# Patient Record
(demographics unavailable — no encounter records)

---

## 2025-01-06 NOTE — CARDIOLOGY SUMMARY
[Today's Date] : [unfilled] [de-identified] : 12/30/2024 [FreeTextEntry1] : Normal sinus rhythm Right Axis Deviation Early repolarization -402 ms  [de-identified] : 2/12/2024 [FreeTextEntry2] : Summary: 1. 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 2. 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 3. Normal mitral valve morphology and inflow Doppler profile and redundant chordae tendinae of anterior leaflet. 4. Trivial mitral valve regurgitation. 5. Trivial tricuspid valve regurgitation, peak systolic instantaneous gradient 9.1 mmHg. 6. Trivial pulmonary valve regurgitation. 7. No pericardial effusion. [de-identified] : 12.27/2024 Chol 207  Trig 47 5/28/24 Chol 200  Trig 87 1/2/24 [id number]  HDL 47 Trig 167 8/8/23 Chol 235  Trig 74

## 2025-01-06 NOTE — CARDIOLOGY SUMMARY
[Today's Date] : [unfilled] [de-identified] : 12/30/2024 [FreeTextEntry1] : Normal sinus rhythm Right Axis Deviation Early repolarization -402 ms  [de-identified] : 2/12/2024 [FreeTextEntry2] : Summary: 1. 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 2. 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 3. Normal mitral valve morphology and inflow Doppler profile and redundant chordae tendinae of anterior leaflet. 4. Trivial mitral valve regurgitation. 5. Trivial tricuspid valve regurgitation, peak systolic instantaneous gradient 9.1 mmHg. 6. Trivial pulmonary valve regurgitation. 7. No pericardial effusion. [de-identified] : 12.27/2024 Chol 207  Trig 47 5/28/24 Chol 200  Trig 87 1/2/24 [id number]  HDL 47 Trig 167 8/8/23 Chol 235  Trig 74

## 2025-01-06 NOTE — CONSULT LETTER
[Today's Date] : [unfilled] [Name] : Name: [unfilled] [] : : ~~ [Today's Date:] : [unfilled] [Dear  ___:] : Dear Dr. [unfilled]: [Consult] : I had the pleasure of evaluating your patient, [unfilled]. My full evaluation follows. [Consult - Single Provider] : Thank you very much for allowing me to participate in the care of this patient. If you have any questions, please do not hesitate to contact me. [Sincerely,] : Sincerely, [FreeTextEntry4] : Bryanna Dumont MD [FreeTextEntry5] : [address] [facility] [FreeTextEntry6] : [place] [address] [de-identified] : Barry E. Goldberg, MD FACC, FAAP, FACE\par  [facility]\par  [facility]\par  Chief Pediatric Cardiology\par

## 2025-01-06 NOTE — PHYSICAL EXAM
[General Appearance - Alert] : alert [General Appearance - In No Acute Distress] : in no acute distress [General Appearance - Well Nourished] : well nourished [General Appearance - Well Developed] : well developed [General Appearance - Well-Appearing] : well appearing [Appearance Of Head] : the head was normocephalic [Facies] : there were no dysmorphic facial features [Sclera] : the conjunctiva were normal [Outer Ear] : the ears and nose were normal in appearance [Examination Of The Oral Cavity] : mucous membranes were moist and pink [Respiration, Rhythm And Depth] : normal respiratory rhythm and effort [Auscultation Breath Sounds / Voice Sounds] : breath sounds clear to auscultation bilaterally [No Cough] : no cough [Stridor] : no stridor was observed [Apical Impulse] : quiet precordium with normal apical impulse [Heart Rate And Rhythm] : normal heart rate and rhythm [Heart Sounds] : normal S1 and S2 [No Murmur] : no murmurs  [Heart Sounds Gallop] : no gallops [Heart Sounds Pericardial Friction Rub] : no pericardial rub [Heart Sounds Click] : no clicks [Arterial Pulses] : normal upper and lower extremity pulses with no pulse delay [Edema] : no edema [Capillary Refill Test] : normal capillary refill [Bowel Sounds] : normal bowel sounds [Abdomen Soft] : soft [Nondistended] : nondistended [Abdomen Tenderness] : non-tender [Musculoskeletal Exam: Normal Movement Of All Extremities] : normal movements of all extremities [Musculoskeletal - Swelling] : no joint swelling seen [Musculoskeletal - Tenderness] : no joint tenderness was elicited [Nail Clubbing] : no clubbing  or cyanosis of the fingers [Motor Tone] : normal muscle strength and tone [Cervical Lymph Nodes Enlarged Anterior] : The anterior cervical nodes were normal [] : no rash [Skin Lesions] : no lesions [Skin Turgor] : normal turgor [Skin Color & Pigmentation] : normal skin color and pigmentation [Demonstrated Behavior - Infant Nonreactive To Parents] : interactive [Mood] : mood and affect were appropriate for age [Demonstrated Behavior] : normal behavior [FreeTextEntry1] : striae

## 2025-01-06 NOTE — REASON FOR VISIT
[Follow-Up] : a follow-up visit for [Patient] : patient [Father] : father [FreeTextEntry1] : congential tricuspid regurgitation [FreeTextEntry3] : PFO

## 2025-01-06 NOTE — CONSULT LETTER
[Today's Date] : [unfilled] [Name] : Name: [unfilled] [] : : ~~ [Today's Date:] : [unfilled] [Dear  ___:] : Dear Dr. [unfilled]: [Consult] : I had the pleasure of evaluating your patient, [unfilled]. My full evaluation follows. [Consult - Single Provider] : Thank you very much for allowing me to participate in the care of this patient. If you have any questions, please do not hesitate to contact me. [Sincerely,] : Sincerely, [FreeTextEntry4] : Bryanna Dumont MD [FreeTextEntry5] : [address]  [facility] [FreeTextEntry6] : [place] [address] [de-identified] : Barry E. Goldberg, MD FACC, FAAP, FACE\par  [facility]\par  [facility]\par  Chief Pediatric Cardiology\par

## 2025-01-06 NOTE — HISTORY OF PRESENT ILLNESS
[FreeTextEntry1] : Jose D presented for follow up on Dec 30, 2024.  He was last evaluated on Feb 12, 2024. He is now 18 years old. 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   Jose D has been well since his last visit . There is no history of dyspnea on exertion, easy fatigability, excessive sweating, palpitations, skipped beats, extra beats or syncopal episodes. There have been no explained fevers, rash or hematuria. To review, he was evaluated on November 26, 2018 when he presented for urgent evaluation. Wednesday 11/21/2018 he was pale at school. He came home and went to bed. He was sleepy and was acting like he had a virus. He became dehydrated but was not febrile. He vomited once. He had some abdominal pain. His mom did an exam and felt if he had appendicitis.  [facility] He was given Zofran and IV fluids. His flu swab was negative. He had bilateral pain. He did not respond and was sent to [facility]. However, mom decided to take him to [facility]. He was subsequently diagnosed with appendicitis. The resident was concerned about a augmented PMI. Cardiology follow up revealed no changes to his clinical examination, EKG or echocardiogram. He has not had any recurrences of augmented PMI  He exercises every day. He had hurt his arm at school and had PT.  In Feb 2024 he presented with another chief complaint of high cholesterol.  His diet is affected by his sensory processing and auditory processing which makes him sensitive to certain textures.  He has tried his best to improve his diet  He gets allergy shots. He is followed by Dr. Khoury. He has a history of asthma which is well-controlled. He is not on any medications at this time. He is followed by Dr. Anne.  He got a tic bite behind the right ear and developed cellulitis. He responded to oral antibiotic. He will have Lyme test.  He plays tennis and swims and is able to keep up with his peers.   He has not been hospitalized. He has not had surgery.  He has a history of ligamentous laxity.   Mom has high cholesterol.  She still has not had her calcium score. Dad has night cholesterol but not high enough to treat.  He also has had HBP His sister is well. There is no family history of congenital heart disease, cardiomyopathies, arrhythmias, genetic heart disease or sudden cardiac death.

## 2025-01-06 NOTE — DISCUSSION/SUMMARY
[PE + No Restrictions] : [unfilled] may participate in the entire physical education program without restriction, including all varsity competitive sports. [Influenza vaccine is recommended] : Influenza vaccine is recommended [FreeTextEntry1] : ZEB's  workup revealed: He was not re-echo'd today. 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 This is of no hemodynamic significance. -He  had the incidental finding of mitral insufficiency. The insufficiency did not appear to be hemodynamically significant and represents a normal variant. -He  had the incidental finding of trivial tricuspid insufficiency. Trivial tricuspid insufficiency is a common finding, considered a physiologic variant of normal and  allowed us to calculate estimated pulmonary artery pressures as normal. -He had the incidental finding of pulmonary insufficiency. The insufficiency did not appear to be hemodynamically significant and represents a normal variant  He has had worsening of his Lipid profile His cholesterol is up 7 points from last blood draw. The LDL is up 12 points. He has been unable to tolerate the psyllium husk. This is in context of prior testing which revealed that he has normal Apolipoprotein B and Lipoprotein A which are independent risk factors for heart disease when elevated.  We again discussed ways to reduce cholesterol: -Using Benacol spread in place of margarine or butter -Psyllium Husk powder - We will try Metamucil this time  -Oatmeal -Oatmeal cookies made with apple sauce -Decreasing dairy and saturated fats. Dietary changed are difficult due to his issue with textures  The importance of excellent hydration starting early in the morning and continue throughout the day was discussed at length. He should drink enough fluid to keep his  urine clear at all times. All caffeine should be removed from his  diet.  He  does not require any restrictions from a cardiac standpoint.  He does not require antibiotic prophylaxis from a cardiac standpoint. He  should continue with his   routine pediatric care.    [Needs SBE Prophylaxis] : [unfilled] does not need bacterial endocarditis prophylaxis

## 2025-01-06 NOTE — HISTORY OF PRESENT ILLNESS
[FreeTextEntry1] : Jose D presented for follow up on Dec 30, 2024.  He was last evaluated on Feb 12, 2024. He is now 18 years old. 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   Jose D has been well since his last visit . There is no history of dyspnea on exertion, easy fatigability, excessive sweating, palpitations, skipped beats, extra beats or syncopal episodes. There have been no explained fevers, rash or hematuria. To review, he was evaluated on November 26, 2018 when he presented for urgent evaluation. Wednesday 11/21/2018 he was pale at school. He came home and went to bed. He was sleepy and was acting like he had a virus. He became dehydrated but was not febrile. He vomited once. He had some abdominal pain. His mom did an exam and felt if he had appendicitis.  [facility]. He was given Zofran and IV fluids. His flu swab was negative. He had bilateral pain. He did not respond and was sent to [facility]. However, mom decided to take him to [facility]. He was subsequently diagnosed with appendicitis. The resident was concerned about a augmented PMI. Cardiology follow up revealed no changes to his clinical examination, EKG or echocardiogram. He has not had any recurrences of augmented PMI  He exercises every day. He had hurt his arm at school and had PT.  In Feb 2024 he presented with another chief complaint of high cholesterol.  His diet is affected by his sensory processing and auditory processing which makes him sensitive to certain textures.  He has tried his best to improve his diet  He gets allergy shots. He is followed by Dr. Khoury. He has a history of asthma which is well-controlled. He is not on any medications at this time. He is followed by Dr. Anne.  He got a tic bite behind the right ear and developed cellulitis. He responded to oral antibiotic. He will have Lyme test.  He plays tennis and swims and is able to keep up with his peers.   He has not been hospitalized. He has not had surgery.  He has a history of ligamentous laxity.   Mom has high cholesterol.  She still has not had her calcium score. Dad has night cholesterol but not high enough to treat.  He also has had HBP His sister is well. There is no family history of congenital heart disease, cardiomyopathies, arrhythmias, genetic heart disease or sudden cardiac death.

## 2025-06-30 NOTE — PHYSICAL EXAM
[General Appearance - Alert] : alert [General Appearance - In No Acute Distress] : in no acute distress [General Appearance - Well Nourished] : well nourished [General Appearance - Well Developed] : well developed [General Appearance - Well-Appearing] : well appearing [Appearance Of Head] : the head was normocephalic [Facies] : there were no dysmorphic facial features [Sclera] : the conjunctiva were normal [Outer Ear] : the ears and nose were normal in appearance [Examination Of The Oral Cavity] : mucous membranes were moist and pink [Respiration, Rhythm And Depth] : normal respiratory rhythm and effort [Auscultation Breath Sounds / Voice Sounds] : breath sounds clear to auscultation bilaterally [No Cough] : no cough [Stridor] : no stridor was observed [Apical Impulse] : quiet precordium with normal apical impulse [Heart Rate And Rhythm] : normal heart rate and rhythm [Heart Sounds] : normal S1 and S2 [No Murmur] : no murmurs  [Heart Sounds Gallop] : no gallops [Heart Sounds Pericardial Friction Rub] : no pericardial rub [Heart Sounds Click] : no clicks [Arterial Pulses] : normal upper and lower extremity pulses with no pulse delay [Edema] : no edema [Capillary Refill Test] : normal capillary refill [Bowel Sounds] : normal bowel sounds [Abdomen Soft] : soft [Nondistended] : nondistended [Abdomen Tenderness] : non-tender [Musculoskeletal Exam: Normal Movement Of All Extremities] : normal movements of all extremities [Musculoskeletal - Swelling] : no joint swelling seen [Musculoskeletal - Tenderness] : no joint tenderness was elicited [Nail Clubbing] : no clubbing  or cyanosis of the fingers [Motor Tone] : normal muscle strength and tone [Cervical Lymph Nodes Enlarged Anterior] : The anterior cervical nodes were normal [] : no rash [Skin Lesions] : no lesions [Skin Turgor] : normal turgor [Skin Color & Pigmentation] : normal skin color and pigmentation [Demonstrated Behavior - Infant Nonreactive To Parents] : interactive [Mood] : mood and affect were appropriate for age [Demonstrated Behavior] : normal behavior [PERRL With Normal Accommodation] : the pupils were equal in size, round, and reactive to light [EOMI] : ~T the extraocular movements were intact [Nasal Cavity] : the nasal mucosa was normal [Oropharynx] : the oropharynx was normal [FreeTextEntry1] : striae

## 2025-06-30 NOTE — CONSULT LETTER
[Today's Date] : [unfilled] [Name] : Name: [unfilled] [] : : ~~ [Today's Date:] : [unfilled] [Dear  ___:] : Dear Dr. [unfilled]: [Consult] : I had the pleasure of evaluating your patient, [unfilled]. My full evaluation follows. [Consult - Single Provider] : Thank you very much for allowing me to participate in the care of this patient. If you have any questions, please do not hesitate to contact me. [Sincerely,] : Sincerely, [FreeTextEntry4] : Bryanna Dumont MD [FreeTextEntry5] : [address]  [facility] [FreeTextEntry6] : [place] [address] [de-identified] : Barry E. Goldberg, MD FACC, FAAP, FACE\par  [facility]\par  [facility]\par  Chief Pediatric Cardiology\par

## 2025-06-30 NOTE — HISTORY OF PRESENT ILLNESS
[FreeTextEntry1] : Jose D presented for follow up on Jun 23, 2025.  He was last evaluated on Dec 30, 2024. He is now 19 years old. 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   Jose D has been well since his last visit. There is no history of dyspnea on exertion, easy fatigability, excessive sweating, palpitations, skipped beats, extra beats or syncopal episodes.  There have been no explained fevers, rash or hematuria. To review, he was evaluated on November 26, 2018 when he presented for urgent evaluation. Wednesday 11/21/2018 he was pale at school. He came home and went to bed. He was sleepy and was acting like he had a virus. He became dehydrated but was not febrile. He vomited once. He had some abdominal pain. His mom did an exam and felt if he had appendicitis.  [facility]. He was given Zofran and IV fluids. His flu swab was negative. He had bilateral pain. He did not respond and was sent to [facility]. However, mom decided to take him to [facility]. He was subsequently diagnosed with appendicitis. The resident was concerned about a augmented PMI. Cardiology follow up revealed no changes to his clinical examination, EKG or echocardiogram. 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 He recently worked at the [employer].  He exercises every day. He had hurt his arm at school and had PT.  In Feb 2024 he presented with another chief complaint of high cholesterol.  His diet is affected by his sensory processing and auditory processing which makes him sensitive to certain textures.  He has tried his best to improve his diet  He gets allergy shots. He is followed by Dr. Khoury. He has a history of asthma which is well-controlled. He is not on any medications at this time. He is followed by Dr. Anne.  He got a tic bite behind the right ear and developed cellulitis. He responded to oral antibiotic. He will have Lyme test.  He plays tennis and swims and is able to keep up with his peers.   He is studying [employer].   He has not been hospitalized. He has not had surgery.  He has a history of ligamentous laxity.   Mom has high cholesterol.  Mom is on Atorvastatin Dad has borderline cholesterol but not high enough to treat.  He also has had HBP. It is now normal. His sister is well. She is now 16.  There is no family history of congenital heart disease, cardiomyopathies, arrhythmias, genetic heart disease or sudden cardiac death.

## 2025-06-30 NOTE — CARDIOLOGY SUMMARY
[Today's Date] : [unfilled] [de-identified] : 06/23/2025 [FreeTextEntry1] : Normal sinus rhythm Right Axis Deviation Early repolarization -420 ms  [de-identified] : 06/23/2025 [FreeTextEntry2] : Summary: 1. 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 2. 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 3. Normal mitral valve morphology and inflow Doppler profile and redundant chordae tendineae of anterior leaflet. 4. Trivial mitral valve regurgitation. 5. Trivial pulmonary valve regurgitation. 6. No pericardial effusion. [de-identified] : 12.27/2024 Chol 207  Trig 47 5/28/24 Chol 200  Trig 87 1/2/24 [id number]  HDL 47 Trig 167 8/8/23 Chol 235  Trig 74

## 2025-06-30 NOTE — REASON FOR VISIT
[Follow-Up] : a follow-up visit for [Patient] : patient [Mother] : mother [FreeTextEntry1] : congential tricuspid regurgitation [FreeTextEntry3] : PFO

## 2025-06-30 NOTE — DISCUSSION/SUMMARY
[PE + No Restrictions] : [unfilled] may participate in the entire physical education program without restriction, including all varsity competitive sports. 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 This is of no hemodynamic significance. - Normal mitral valve morphology and inflow Doppler profile and redundant chordae tendinae of anterior leaflet. -He had the incidental finding of mitral insufficiency. The insufficiency did not appear to be hemodynamically significant and represents a normal variant. -He had the incidental finding of pulmonary insufficiency. The insufficiency did not appear to be hemodynamically significant and represents a normal variant  He has dyslipidemia.  He is unwilling to use the psyllium husk. We talked about the importance, the different choices and strategies.  This is in context of prior testing which revealed that he has normal Apolipoprotein B and Lipoprotein A which are independent risk factors for heart disease when elevated.  We again discussed ways to reduce cholesterol: -Using Benacol spread in place of margarine or butter -Psyllium Husk powder - We will try Metamucil this time  -Oatmeal -Oatmeal cookies made with apple sauce -Decreasing dairy and saturated fats. Dietary changed are difficult due to his issue with textures  New blood work was requested The importance of excellent hydration starting early in the morning and continue throughout the day was discussed at length. He should drink enough fluid to keep his  urine clear at all times. All caffeine should be removed from his  diet. He  does not require any restrictions from a cardiac standpoint. He does not require antibiotic prophylaxis from a cardiac standpoint.  He should continue with his   routine pediatric care.    [Needs SBE Prophylaxis] : [unfilled] does not need bacterial endocarditis prophylaxis